# Patient Record
(demographics unavailable — no encounter records)

---

## 2024-10-07 NOTE — ADDENDUM
[FreeTextEntry1] : This note was written by Citlaly Campo, acting as the  for Dr. Newby. This note accurately reflects the work and decisions made by Dr. Newby.

## 2024-10-07 NOTE — HISTORY OF PRESENT ILLNESS
[Pain Location] : pain [] : right hip [Worsening] : worsening [___ wks] : [unfilled] week(s) ago [Constant] : ~He/She~ states the symptoms seem to be constant [Bending] : worsened by bending [Direct Pressure] : worsened by direct pressure [Hip Movement] : worsened by hip movement [Walking] : worsened by walking [Acetaminophen] : relieved by acetaminophen [NSAIDs] : relieved by nonsteroidal anti-inflammatory drugs [de-identified] : 48 year old female presents to the office today for a follow-up appointment for his right hip and groin pain. Since last visit, patient states pain is the same. Pain seems to be worse in an active day of movement. Pain exacerbated by rising from laying down as well as from a seated position. States pain is to the touch as well. Is here to go over results of her recent MRI. Has not started PT yet. Patient takes Advil and Tylenol for the pain, to very little relief. States she feels some intermittent numbness and tingling to the right leg. The patient denies any falls or trauma. No constitutional symptoms noted at this time.  [de-identified] : rising from laying down and sitting [de-identified] : Advil, Tylenol

## 2024-10-07 NOTE — DISCUSSION/SUMMARY
[Medication Risks Reviewed] : Medication risks reviewed [Surgical risks reviewed] : Surgical risks reviewed [PRN] : PRN [de-identified] : Patient is a 48-year-old female here today for follow-up of her right leg pain.  Based on her exam as well as her MRI and x-ray I do not think that the majority the pain she is experiencing is coming from her hip.  She has endorsed some numbness and tingling in the lower extremity.  I do think she may have a component of lumbar radiculopathy.  I have therefore given her referral over to the physiatry service for further evaluation management.  I have also given her a referral for GI to follow-up the results of her CT scan.  I will see her back on as-needed basis for her right hip.  All questions were asked and answered

## 2024-10-07 NOTE — PHYSICAL EXAM
[Normal] : Gait: normal [de-identified] : Musculoskeletal: ambulates with full flexion. Right hip exam showed no groin pain with SLR, ROM is with full flexion, 30 internal and 70 external rotation. BIENVENIDO positive,  FADIR negative.5/5 motor strength in bilateral lower extremities. Sensory: Intact in bilateral lower extremities. DTRs: Biceps, brachioradialis, triceps, patellar, ankle and plantar 2+ and symmetric bilaterally. Pulses: dorsalis pedis, posterior tibial, femoral, popliteal, and radial 2+ and symmetric bilaterally. [de-identified] :  RT HIP MRI 09/25/2024 IMPRESSION: -Mild degenerative changes of the hip. -Possible right-sided rectal wall thickening. Further evaluation with dedicated contrast-enhanced CT or sigmoidoscopy may be of utility for more definitive characterization. normal...

## 2024-10-09 NOTE — DATA REVIEWED
[FreeTextEntry1] : X-ray L Spine 8/2024 reviewed and interpreted by me: no significant degenerative changes seen  EXAM: [id number] - XR HIP 2-3V RT - ORDERED BY: AMA HELMS  EXAM: [id number] - XR LS SPINE AP LAT 2-3 VIEWS - ORDERED BY: AMA HELMS   PROCEDURE DATE: 08/09/2024    INTERPRETATION: XR LUMBAR SPINE AP AND LATERAL 2 OR 3 VIEWS, XR HIP 2 OR 3 VIEWS RIGHT  HISTORY: Lumbar spine and right hip and groin pain. No injury.  VIEWS: 2 views each IMAGES: 4  COMPARISON: None.  FINDINGS:  OSSEOUS STRUCTURES Fractures: None.  ALIGNMENT: Maintained.  DISC SPACES: Maintained.  PELVIC JOINTS: Hip and sacroiliac joints appear preserved. There is acetabular uncovering of both hips that may reflect a degree of dysplasia.  SOFT TISSUES: Unremarkable.  IMPRESSION: 1. No acute osseous abnormalities.  --- End of Report ---       AMANDA LOMAX MD; Attending Radiologist This document has been electronically signed. Aug 15 2024 12:57PM

## 2024-10-09 NOTE — HISTORY OF PRESENT ILLNESS
[FreeTextEntry1] : Ms. JED LUIS is a 48 year old female who presents with right groin pain radiating to low back and lateral hip. Patient reports that she saw a rheumatologist and labs showed she was ASCENCION positive but other workup negative. She was then referred to orthopedic surgery who ordered imaging of her hip revealing mild degenerative changes. She was referred here for workup of lumbar radiculopathy. Reports that she has a history of two C-sections ~20 years ago and uterine polyps. She was found to have thickening of her rectum on hip MRI and has a CT of abdomen/pelvis scheduled for further evaluation.   Location: right groin/buttock Onset: since July 2024, states she woke up one day with the pain but denies any specific injury Provocation/Palliative: Worse with sitting for long periods and going from sit to stand, better with activity Quality: sharp pain in right groin, shooting down medial right leg, ache in low back/lateral hip Radiation: Pain and tingling occasionally down medial proximal RLE Severity: 6/10  Timing: waxes and wanes   Admits to some tingling down RLE on medial side. Denies any associated leg weakness. Denies any loss of bowel/bladder control or any groin numbness. Previous medications trialed: Advil/Motrin does not provide significant relief Previous procedures relevant to complaint: denies Conservative therapy tried?: denies

## 2024-10-09 NOTE — ASSESSMENT
[FreeTextEntry1] : Ms. JED LUIS is a 48 year old female who presents with right groin pain radiating to low back and lateral hip. Right hip MRI showed mild degenerative changes. Pain likely secondary to lumbar radiculopathy vis SIJ inflammation. Denies any red flag signs. Will recommend:  - X-ray L Spine reviewed - MRI lumbar spine ordered to evaluate for upper lumbar nerve root impingement  - Start PT 2-3x/week for stretching, strengthening (especially of core muscles), ROM exercises, HEP and modalities PRN including myofascial release, moist heat  - Mobic 15mg PO QD PRN. Patient advised on cardiac/gi/renal side effects. Patient encouraged to take medication with food and not with other NSAIDs.   RTC after imaging. Patient aware of red flag signs including any changes to their bowel/bladder control, groin numbness or new weakness. Patient knows to seek immediate attention by calling 911 or going to nearest ER if these symptoms appear.   This patient is being managed for a complex chronic pain that requires ongoing medical management. The nature of this condition requires a longitudinal relationship and monitoring over time for appropriate treatment.

## 2024-10-23 NOTE — HISTORY OF PRESENT ILLNESS
[FreeTextEntry1] : Ms. JED LUIS is a 48 year old female who presents for follow up. At last visit, she was ordered an MRI L Spine, started on PT and given Mobic. She is taking Mobic with mild relief. Pain has been persistent. Pain is still mainly in R anterior groin and can refer to R buttock.    Location: right groin/buttock Onset: since July 2024, states she woke up one day with the pain but denies any specific injury Provocation/Palliative: Worse with sitting for long periods and going from sit to stand, better with activity Quality: sharp pain in right groin, shooting down medial right leg, ache in low back/lateral hip Radiation: Pain and tingling occasionally down medial proximal RLE but inconsistent Severity: 6/10 Timing: waxes and wanes  No bowel/bladder changes. No groin numbness.

## 2024-10-23 NOTE — DATA REVIEWED
[FreeTextEntry1] : EMG/NCS 10/2024 reviewed   EXAM: [id number] - MR SPINE LUMBAR  - ORDERED BY: GAVI KC   PROCEDURE DATE:  10/15/2024    INTERPRETATION:  MR LUMBAR SPINE WITHOUT CONTRAST  TECHNIQUE: Multiplanar multisequence imaging of the lumbar spine was performed without the administration of intravenous contrast.  CLINICAL INDICATION: Persistent right buttock/groin pain. Concern for upper lumbar radiculopathy. COMPARISON: None. ____________________ FINDINGS: Mildly degraded by motion artifact. L5-S1 is at image 54 of series 6. There are 5 lumbar type vertebral bodies.  Very mild lumbar levoscoliosis with an apex near L3-L4. Minimal retrolisthesis of L4 on L5. Lumbar alignment otherwise maintained. Visualized vertebral body heights are maintained. No acute fractures. No aggressive osseous lesions.  Conus has a normal appearance and terminates at L1.  Decreased T2 signal of the majority of the lumbar disks compatible with degenerative disc disease.  L1-L2: Disc bulge. No significant disc height loss. No significant spinal canal or neuroforaminal stenosis. Mild hypertrophic facet joint degeneration.  L2-L3: Disc bulge. No significant disc height loss. No significant spinal canal or neuroforaminal stenosis. Mild hypertrophic facet joint degeneration.  L3-L4: Disc bulge. No significant disc height loss. No significant spinal canal or neuroforaminal stenosis. Moderate hypertrophic facet joint degeneration with hypertrophy of ligamentum flavum and bilateral facet joint effusions.  L4-L5: Disc bulge. No significant disc height loss. No significant spinal canal or neuroforaminal stenosis. Moderate to severe hypertrophic facet joint degeneration with hypertrophy of ligamentum flavum and bilateral facet joint effusions.  L5-S1: Disc bulge. No significant disc height loss. No significant spinal canal or neuroforaminal stenosis. Moderate hypertrophic facet joint degeneration with hypertrophy of ligamentum flavum and bilateral facet joint effusions.  Paraspinal Soft Tissues/Retroperitoneum: Unremarkable. ____________________ IMPRESSION:  1.  Multilevel lumbar spondylosis as detailed above with no significant spinal canal or neuroforaminal stenosis.  --- End of Report ---       VICTOR MANUEL BARNES MD; Attending Radiologist

## 2024-10-23 NOTE — PHYSICAL EXAM
[FreeTextEntry1] : PE: Constitutional: In NAD, calm and cooperative MSK (Back/R hip)  Inspection: no gross swelling identified  Palpation: Tenderness of the right PSIS, SIJ, buttock  ROM: No pain with ROM  Strength: 5/5 strength in bilateral lower extremities with the exception of 4+/5 right hip flexion limited by pain  Reflexes: 2+ Patella reflex bilaterally, 2+ Achilles reflex bilaterally, negative clonus bilaterally  Sensation: Intact to light touch in bilateral lower extremities Special tests: SLR: negative bilaterally BIENVENIDO: negative on left, positive on right with pain in groin and SIJ FADIR: equivocal on R Facet loading: negative bilaterally Yeomans: negative bilaterally.

## 2024-10-23 NOTE — DATA REVIEWED
[FreeTextEntry1] : EMG/NCS 10/2024 reviewed   EXAM: [id number] - MR SPINE LUMBAR  - ORDERED BY: GAVI KC   PROCEDURE DATE:  10/15/2024    INTERPRETATION:  MR LUMBAR SPINE WITHOUT CONTRAST  TECHNIQUE: Multiplanar multisequence imaging of the lumbar spine was performed without the administration of intravenous contrast.  CLINICAL INDICATION: Persistent right buttock/groin pain. Concern for upper lumbar radiculopathy. COMPARISON: None. ____________________ FINDINGS: Mildly degraded by motion artifact. L5-S1 is at image 54 of series 6. There are 5 lumbar type vertebral bodies.  Very mild lumbar levoscoliosis with an apex near L3-L4. Minimal retrolisthesis of L4 on L5. Lumbar alignment otherwise maintained. Visualized vertebral body heights are maintained. No acute fractures. No aggressive osseous lesions.  Conus has a normal appearance and terminates at L1.  Decreased T2 signal of the majority of the lumbar disks compatible with degenerative disc disease.  L1-L2: Disc bulge. No significant disc height loss. No significant spinal canal or neuroforaminal stenosis. Mild hypertrophic facet joint degeneration.  L2-L3: Disc bulge. No significant disc height loss. No significant spinal canal or neuroforaminal stenosis. Mild hypertrophic facet joint degeneration.  L3-L4: Disc bulge. No significant disc height loss. No significant spinal canal or neuroforaminal stenosis. Moderate hypertrophic facet joint degeneration with hypertrophy of ligamentum flavum and bilateral facet joint effusions.  L4-L5: Disc bulge. No significant disc height loss. No significant spinal canal or neuroforaminal stenosis. Moderate to severe hypertrophic facet joint degeneration with hypertrophy of ligamentum flavum and bilateral facet joint effusions.  L5-S1: Disc bulge. No significant disc height loss. No significant spinal canal or neuroforaminal stenosis. Moderate hypertrophic facet joint degeneration with hypertrophy of ligamentum flavum and bilateral facet joint effusions.  Paraspinal Soft Tissues/Retroperitoneum: Unremarkable. ____________________ IMPRESSION:  1.  Multilevel lumbar spondylosis as detailed above with no significant spinal canal or neuroforaminal stenosis.  --- End of Report ---       VICTOR MANUEL BARNES MD; Attending Radiologist

## 2024-10-23 NOTE — ASSESSMENT
[FreeTextEntry1] : Ms. JED LUIS is a 48 year old female who presents with right groin pain radiating to low back and lateral hip. Right hip MRI showed mild degenerative changes. Etiology of pain is unclear given MRI L Spine did not show any significant nerve root impingements. Denies any red flag signs. Will recommend: - MRI L Spine reviewed with patient - Start PT 2-3x/week for stretching, strengthening (especially of core muscles), ROM exercises, HEP and modalities PRN including myofascial release, moist heat - Continue Mobic 15mg PO QD PRN. Patient advised on cardiac/gi/renal side effects. Patient encouraged to take medication with food and not with other NSAIDs. - Discussed R/B/A of a R Hip Intraarticular CSI to help diagnose the cause of her pain for which she would like to proceed. If pain receives significant relief from injection, pain is likely hip related. If not, we may explore other interventional options such as an SI joint CSI.   RTC after injection. Patient aware of red flag signs including any changes to their bowel/bladder control, groin numbness or new weakness. Patient knows to seek immediate attention by calling 911 or going to nearest ER if these symptoms appear.  This patient is being managed for a complex chronic pain that requires ongoing medical management. The nature of this condition requires a longitudinal relationship and monitoring over time for appropriate treatment.

## 2024-11-26 NOTE — PLAN
[FreeTextEntry1] :  48 year old female presents for post-op evaluation.  -Discussed alternate management for AUB- surgical/medical   F/u with Dr. Cartwright. Lucila Recinos MD

## 2024-11-26 NOTE — HISTORY OF PRESENT ILLNESS
[Pain is well-controlled] : pain is well-controlled [Clean/Dry/Intact] : clean, dry and intact [None] : no vaginal bleeding [Normal] : normal [Pathology reviewed] : pathology reviewed [Fever] : no fever [Chills] : no chills [Nausea] : no nausea [Vomiting] : no vomiting [Erythema] : not erythematous [de-identified] : 11/7/2024 [de-identified] : Hysteroscopy with dilation and curettage.   [de-identified] :  48 year old female presents for post-op evaluation. Reports doing okay and is spotting. Notes eating okay.

## 2024-11-26 NOTE — END OF VISIT
[FreeTextEntry3] : I, Magnolia Kat, acted as a scribe on behalf of Dr. Lucila Recinos M.D. on 11/26/2024.   All medical entries made by the scribe were at my, Dr. Lucila Recinos M.D., direction and personally dictated by me on 11/26/2024. I have reviewed the chart and agree that the record accurately reflects my personal performance of the history, physical exam, assessment and plan. I have also personally directed, reviewed, and agreed with the chart.

## 2024-11-29 NOTE — PAST MEDICAL HISTORY
[Menstruating] : The patient is menstruating [Menarche Age ____] : age at menarche was [unfilled] [History of Hormone Replacement Treatment] : has no history of hormone replacement treatment [Total Preg ___] : G[unfilled] [Age At Live Birth ___] : Age at live birth: [unfilled]

## 2024-11-29 NOTE — DATA REVIEWED
[FreeTextEntry1] : ***MAMMO DIG HAYDEN***  04/18/24  CLINICAL INDICATION: Patient is 48 years old and is seen for screening. The patient has no personal history of cancer. The patient has a history of right excision in November, 2022 - benign. The patient has the following family history of breast cancer: paternal aunt, at age 40.  RISK ASSESSMENT: Tyrer-Cuzick Lifetime Risk: 26.1%  LAST CLINICAL BREAST EXAM: The patient reports their last clinical breast exam was performed within the past year.  COMPARISON STUDIES: The present examination has been compared to a prior imaging study dated 03/31/2023, 9/29/2022, 2/28/2022, 2/18/2022, 2/15/2021, and 2/6/2020.  TECHNIQUE: Bilateral mammography was performed including CC and MLO views. Left breast magnification views were obtained. Additional imaging analysis was performed using CAD (computer-aided detection) software. Digital breast tomosynthesis was performed and used in the interpretation of images.  FINDINGS: The breasts are extremely dense, which lowers the sensitivity of mammography.  No suspicious mass, suspicious microcalcifications, or other sign of malignancy is identified.  There are new calcifications in the upper-outer left breast, which on the magnification views appear to have a layering appearance, compatible with benign milk of calcium.  Postsurgical scar in the upper-outer posterior left breast is unchanged.  Bilateral nodularity is again noted.  BREAST ARTERIAL CALCIFICATION ([place]): Grade 0 - No vascular calcifications. Note: The absence of breast arterial calcification does not exclude cardiovascular disease. Management of cardiovascular risk factors should be based clinically.  BILATERAL BREAST ULTRASOUND COMPLETE  CLINICAL INDICATION: The breasts are extremely dense, which lowers the sensitivity of mammography. Recommended ultrasound follow-up.  COMPARISON STUDIES: Dated  7/21/2023, 3/31/2023, 1/13/2023, 8/29/2022, 6/8/2022, 2/28/2022, 2/18/2022, 2/25/2021, 2/15/2021, 8/7/2020, 2/6/2020.  RIGHT BREAST: Sonographic evaluation of the right breast was performed in its entirety, including each of the four quadrants and the retroareolar region, in clockwise fashion. Images were obtained by  the technologist and submitted for interpretation.  FINDINGS: The breast parenchyma demonstrates a heterogeneous background echotexture (mixed fatty and fibroglandular).  No suspicious solid mass.  At 6:00, 6 cm from the nipple there is no significant change since the 6/8/2022 images in a 0.9 x 0.3 x 0.8 cm circumscribed ovoid hypoechoic nodule.  At the retroareolar position there is no significant change since the 6/8/2022 images in a 0.7 x 0.4 x 0.5 cm circumscribed ovoid hypoechoic nodule.  Multiple scattered cysts are noted in the right breast, up to 1.1 cm in size.  LEFT BREAST: Sonographic evaluation of the left breast was performed in its entirety, including each of the four quadrants and the retroareolar region, in clockwise fashion. Images were obtained by  the technologist and submitted for interpretation.  FINDINGS: The breast parenchyma demonstrates a heterogeneous background echotexture (mixed fatty and fibroglandular).  No suspicious solid mass.  At 12:00, 3 cm from the nipple there is no significant change since the 2/6/2020 images in a 0.7 x 0.5 x 0.5 cm circumscribed hypoechoic nodule.  At 2:00, 4 cm from the nipple is a 0.5 x 0.3 x 0.5 cm circumscribed ovoid hypoechoic nodule without significant change since the 2/15/2021 images.  Multiple scattered cysts are noted in the left breast up to 0.7 cm in size.  IMPRESSION: No mammographic or sonographic evidence of malignancy. No suspicious changes.  RECOMMENDATION:  Mammography in 1 year.  Additionally, please note the last breast MRI report of 4/1/2024 recommended follow-up MRI in 6 months.  BI-RADS 2 - Benign Finding(s)

## 2024-11-29 NOTE — PHYSICAL EXAM
[Normocephalic] : normocephalic [Atraumatic] : atraumatic [EOMI] : extra ocular movement intact [PERRL] : pupils equal, round and reactive to light [Sclera nonicteric] : sclera nonicteric [Supple] : supple [No Supraclavicular Adenopathy] : no supraclavicular adenopathy [Examined in the supine and seated position] : examined in the supine and seated position [Symmetrical] : symmetrical [Bra Size: ___] : Bra Size: [unfilled] [None] : no ptosis [No dominant masses] : no dominant masses in right breast  [No dominant masses] : no dominant masses left breast [No Nipple Retraction] : no left nipple retraction [No Nipple Discharge] : no left nipple discharge [No Axillary Lymphadenopathy] : no left axillary lymphadenopathy [No Edema] : no edema [No Rashes] : no rashes [No Ulceration] : no ulceration [de-identified] : Well-healed incision UOQ/lower axilla

## 2024-11-29 NOTE — HISTORY OF PRESENT ILLNESS
[FreeTextEntry1] : Problem List: 1. 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 Right breast 12:00, 2cm FN complicated cyst, as well as multiple smaller complicated cysts and two new hypoechoic nodules (stable since June '22)      -6 month followup  3. Dense breasts, D 4. Elevated Tyrer-Cuzick score 36%  5. Family history of breast and pancreatic cancer  INTERVAL HISTORY: (11/28/22): 46 year old female who presents for post-op followup. She is healing well without complaint.   (09/06/23): Patient presents for follow up visit. Patient with no breast complaints.   HPI:  46 year old premenopausal female who underwent annual screening and was found to have complicated cyst in the right breast and a 3cm mass in the left breast. Patient underwent core needle biopsy of left breast on 8/29/22. Final pathology has demonstrated - Fibroadenomatoid nodule. She felt this left nodule was getting larger and more bothersome and elected to proceed with surgical excision as well as right cyst aspiration.   Fam hx-Mother had pancreatic cancer, paternal aunt had breast cancer at 42.   Imaging at [facility] 2/18/22 screening mammo impression- left breast additional imaging at this time with diagnostic mammography and u/s. Birads 0. new low density mass upper outer left posterior not accounted for on today's same day screening u/s  2/28/22 left diagnostic mammo edil impression- probable benign finding, benign breast nodule  6/8/22 right breast ultrasound impression-right breast corresponds to a stable 2.5cm complicated cyst. subcentimeter hypoechoic nodules are new and probably benign. Recommend 6 month follow up is advised. Birads 3  [facility] and Ultrasound Breast Limited Left: Density D, Impression - No suspicious mass, microcalcifications or other sign of malignancy is identified. A circumscribed mass in the upper anterior left breast has increased in size, now measuring 1 cm. This correlated with cyst on concurrent ultrasound. No significant change in right breast. No mammographic or sonographic evidence of malignancy. BI-RADS 2   [facility] Right: Impression - No significant change in right breast ultrasound findings for one year. Continued ultrasound follow up in 6 months to confirm longer term stability. BI-RADS 3   12/02/24: Pt is transferring care from my collegue Dr. Camargo.  04/18/24 MAMMO SCREEN: No mammographic or sonographic evidence of malignancy. No suspicious changes.

## 2024-11-29 NOTE — ASSESSMENT
[FreeTextEntry1] : 47 year old female with a left breast biopsy-proven fibroadenomatoid nodule s/p excisional biopsy on 11/11/22, as well as a right breast palpable/painful cyst s/p aspiration, as well as additional hypoechoic nodules in the right breast for which 6 month followup US is recommended.   CBE is benign. We reviewed her recent right breast ultrasound, will continue to follow nodules and repeat in 6 months, at the time of her annual screening imaging.   Re-calculated her Tyrer Cuzick score in the office, 36%. Patient qualifies for high risk screening.  We discussed the clinical significance of being high-risk for breast cancer and the implications for additional screening. This would include both biannual clinical breast exams as well as imaging screening with mammogram and MRI (in 6-month intervals). We discussed that MRI screening does not improve overall survival from breast cancer, but has been shown to detect breast cancer earlier, resulting in smaller surgeries, less invasive treatment, and less morbidity. We discussed risks of MRI including contrast administration, possibility of additional imaging/biopsies, as well as the contrast accumulation in the brain (which has not shown any clinical significance to date).   Patient verbalized understanding for all treatment plans discussed. All of her questions were answered to the best of my ability. She was encouraged to call the office if any questions or concerns or come in sooner if needed.  Plan: 1.  Arlyn Camacho MD, FACS Director, Breast Surgery [facility]

## 2025-01-28 NOTE — HISTORY OF PRESENT ILLNESS
[FreeTextEntry1] :  49 year old female presents for bleeding f/u. Reports breakthrough bleeding since surgery in November. States recent periods have had more clots. Denies having this amount of clots in the past. Recent sono showed another polyp that's 1.5 cm. Notes pelvic pain is still occurrring. Had lumpectomy a year ago and sees a breast surgeon. Denies needing breast rx. Pt would like to transfer care to Dr. Recinos moving forward.   US Pelvis FINDINGS: Uterus: 10.7 cm x 5.6 cm x 6.2 cm. Within normal limits. Endometrium: 15 mm. Mildly prominent endometrium measuring 15 mm. 1.9 x 1.0 x 1.7 cm nodular focus possibly a polyp versus focal endometrial hyperplasia.  Right ovary: 3.1 cm x 1.9 cm x 2.3 cm. 1.3 cm dominant follicle. Within normal limits. Normal arterial and venous waveforms. Left ovary: 4.1 cm x 2.2 cm x 1.6 cm. 1.5 cm dominant follicle. Within normal limits. Normal arterial and venous waveforms.  IMPRESSION: Mildly prominent endometrium measuring 15 mm. 1.9 x 1.0 x 1.7 cm nodular focus possibly a polyp versus focal endometrial hyperplasia.  --- End of Report ---

## 2025-01-28 NOTE — PLAN
[FreeTextEntry1] :  49 year old female presents for bleeding f/u.   -discussed management of AUB,  D&C -Discussed and advised Mirena IUD insertion at time of D&C, addressed pt's concerns -reviewed risk of migration, perforation, bleeding profile with Mirena IUD, discussed r/b/a -reviewed GNRH analogs, hysterectomy, pt declining -discussed risks of hysteroscopy including but not limited to VTE, SSI, uterine perforation, fluid overload, damage to nearby organs  Lucila Recinos MD .

## 2025-03-10 NOTE — PLAN
[FreeTextEntry1] : VISION SCREENING SAFETY / HEALTHY HABITS REVIEWED HEALTHY DIET AND LIFESTYLE MODIFICATIONS VACCINATIONS DISCUSSED: FLU, COVID, TDAP CHECK ROUTINE LAB WORK EKG DECLINED FOLLOW-UP ALL SPECIALISTS AS DIRECTED; WILL HAVE RHEUMATOLOGY SEND OVER CONSULTANT NOTE CALL WITH ANY QUESTIONS, CONCERNS OR CHANGES

## 2025-03-10 NOTE — HISTORY OF PRESENT ILLNESS
[FreeTextEntry1] : PHYSICAL [de-identified] : MS. LUIS IS A PLEASANT 48 YO PRESENTING FOR YEARLY PHYSICAL.  PLANS FOR D & C FOR THICKENED ENDOMETRIUM WITH GYN/ONCOLOGY.  SAW RHEUMATOLOGY FOR +ANA.  HAD ADDITIONAL LAB WORK AND ADVISED "INCONCLUSIVE" AND TO FOLLOW-UP IN ONE YEAR WHICH IS THIS APRIL.  RHEUMATOLOGY: DR. CAMPOS OFFICE  GYN ONCOLOGY: DR. CORCORAN GYN: DR. OBREGON BREAST SURGERY: DR. BAKER PM&R: DR. KC ORTHOPEDICS: DR. SHIRLEY NEUROLOGY: RAFFI ALVAREZ GI: DR. TAVERAS

## 2025-03-10 NOTE — HEALTH RISK ASSESSMENT
[Very Good] : ~his/her~ current health as very good [Excellent] : ~his/her~  mood as  excellent [Yes] : Yes [2 - 3 times a week (3 pts)] : 2 - 3  times a week (3 points) [1 or 2 (0 pts)] : 1 or 2 (0 points) [Never (0 pts)] : Never (0 points) [No] : In the past 12 months have you used drugs other than those required for medical reasons? No [Little interest or pleasure doing things] : 1) Little interest or pleasure doing things [Feeling down, depressed, or hopeless] : 2) Feeling down, depressed, or hopeless [0] : 2) Feeling down, depressed, or hopeless: Not at all (0) [PHQ-2 Negative - No further assessment needed] : PHQ-2 Negative - No further assessment needed [Never] : Never [NO] : No [HIV test declined] : HIV test declined [Hepatitis C test declined] : Hepatitis C test declined [None] : None [With Family] : lives with family [# of Members in Household ___] :  household currently consist of [unfilled] member(s) [Employed] : employed [College] : College [] :  [# Of Children ___] : has [unfilled] children [Sexually Active] : sexually active [Feels Safe at Home] : Feels safe at home [Reports normal functional visual acuity (ie: able to read med bottle)] : Reports normal functional visual acuity [Smoke Detector] : smoke detector [Carbon Monoxide Detector] : carbon monoxide detector [Safety elements used in home] : safety elements used in home [Seat Belt] :  uses seat belt [Sunscreen] : uses sunscreen [With Patient/Caregiver] : , with patient/caregiver [Audit-CScore] : 3 [de-identified] : PELETON BIKE THREE DAYS A WEEK, STRENGHTENING, WALKING [de-identified] : TRIES TO KEEP A HEALTHY DIET [[id number]] : 0 [Change in mental status noted] : No change in mental status noted [High Risk Behavior] : no high risk behavior [Reports changes in hearing] : Reports no changes in hearing [Reports changes in vision] : Reports no changes in vision [Reports changes in dental health] : Reports no changes in dental health [Travel to Developing Areas] : does not  travel to developing areas [TB Exposure] : is not being exposed to tuberculosis [Caregiver Concerns] : does not have caregiver concerns [MammogramDate] : 04/24 [PapSmearDate] : 08/24 [ColonoscopyDate] : 06/24 [de-identified] : GLASSES FOR DISTANCE [AdvancecareDate] : 03/25

## 2025-04-29 NOTE — HISTORY OF PRESENT ILLNESS
[FreeTextEntry1] :  49 year old female presents for POV and  IUD check. Has Mirena IUD. Reports doing well. Currently having light bleeding. Notes blood when wiping. Experienced cramping right after insertion, but no longer having cramping. Would like to return here for annual. Reports having mammo done last week.  states she is happy with the IUD path reviewed  [Mammogramdate] : 4/2025 [BreastSonogramDate] : 4/2025

## 2025-04-29 NOTE — END OF VISIT
[FreeTextEntry3] : I, Magnolia Kat, acted as a scribe on behalf of Dr. Lucila Recinos M.D. on 04/29/2025.   All medical entries made by the scribe were at my, Dr. Lucila Recinos M.D., direction and personally dictated by me on 04/29/2025. I have reviewed the chart and agree that the record accurately reflects my personal performance of the history, physical exam, assessment and plan. I have also personally directed, reviewed, and agreed with the chart.

## 2025-04-29 NOTE — PROCEDURE
[Locate IUD] : locate IUD [Transvaginal Ultrasound] : transvaginal ultrasound [FreeTextEntry4] : IUD in correct place at fundus, no adenxal masses

## 2025-04-29 NOTE — PLAN
[FreeTextEntry1] :  49 year old female presents for IUD check.  -Continue Mirena -Mammo UTD -path reviewed F/u for annual pt prefers this office Lucila Recinos MD

## 2025-04-29 NOTE — PHYSICAL EXAM
[Appropriately responsive] : appropriately responsive [Alert] : alert [No Acute Distress] : no acute distress [Oriented x3] : oriented x3 [Labia Majora] : normal [Labia Minora] : normal [Scant] : There was scant vaginal bleeding [Normal] : normal [Uterine Adnexae] : normal

## 2025-06-03 NOTE — ASSESSMENT
[FreeTextEntry1] : 49 year old female with a left breast biopsy-proven fibroadenomatoid nodule s/p excisional biopsy on 11/11/22,and high risk  due to family hx Re-calculated her Tyrer Cuzick score in the office, 36%. Patient qualifies for high risk screening.  We discussed the clinical significance of being high-risk for breast cancer and the implications for additional screening. This would include both biannual clinical breast exams as well as imaging screening with mammogram and MRI (in 6-month intervals). We discussed that MRI screening does not improve overall survival from breast cancer, but has been shown to detect breast cancer earlier, resulting in smaller surgeries, less invasive treatment, and less morbidity. We discussed risks of MRI including contrast administration, possibility of additional imaging/biopsies, as well as the contrast accumulation in the brain (which has not shown any clinical significance to date).  Pt did request that MRI are spaced out a bit more than one year. I think given stable imaging, this is reasonable. She is due for MRI sept 2025, but I think it is reasonable to do every other year MRI.  She does understand that mammo is not as good as mri and we can miss a cancer on mammogram which we would have caught on MRI.  I also stated the standard of care is to do annual MRI for high risk screening, but there is room for individualizing these recommendations. For example her family  hx is not extensive, given stable MRI last year, reasonable to do every other year unless new findings occur on mammo US.   Breast calcifications and elevated lifetime risk of breast cancer Breast calcifications are well-managed with no concerning findings on recent diagnostic mammogram and sonogram. No palpable masses. Family history includes a paternal aunt with breast cancer at age 40, elevating risk to 30% due to family history and previous biopsy. imaging stability supports extending MRI interval. - Order bilateral diagnostic mammogram in April 2026 - Schedule follow-up appointment in person after April 2026 mammogram - Order MRI in September 2026 - Advise to report any changes in breast condition in the interval    This was a 30 minutes visit. This consists of the time I spent preparing to see the patient, reviewing and obtaining her history, and reviewing prior tests and her chart in general. This also includes my time spent in performing a medically appropriate examination and/or evaluation, counseling and educating the patient/family/caregiver, ordering medications, tests, or procedures, referring and communicating with other health care professionals, documenting clinical information in the electronic or other health records, independently interpreting results (not separately reported) and communicating results to the patient/family/caregiver, coordination of her care. All the questions were answered to her satisfaction. The plan and expectations were reviewed with the patient; she understood, agreed with it, and was grateful for the care and attention received today.  Arlyn Camacho MD, FACS Director, Breast Surgery [facility]

## 2025-06-03 NOTE — ASSESSMENT
[FreeTextEntry1] : 49 year old female with a left breast biopsy-proven fibroadenomatoid nodule s/p excisional biopsy on 11/11/22,and high risk  due to family hx Re-calculated her Tyrer Cuzick score in the office, 36%. Patient qualifies for high risk screening.  We discussed the clinical significance of being high-risk for breast cancer and the implications for additional screening. This would include both biannual clinical breast exams as well as imaging screening with mammogram and MRI (in 6-month intervals). We discussed that MRI screening does not improve overall survival from breast cancer, but has been shown to detect breast cancer earlier, resulting in smaller surgeries, less invasive treatment, and less morbidity. We discussed risks of MRI including contrast administration, possibility of additional imaging/biopsies, as well as the contrast accumulation in the brain (which has not shown any clinical significance to date).  Pt did request that MRI are spaced out a bit more than one year. I think given stable imaging, this is reasonable. She is due for MRI sept 2025, but I think it is reasonable to do every other year MRI.  She does understand that mammo is not as good as mri and we can miss a cancer on mammogram which we would have caught on MRI.  I also stated the standard of care is to do annual MRI for high risk screening, but there is room for individualizing these recommendations. For example her family  hx is not extensive, given stable MRI last year, reasonable to do every other year unless new findings occur on mammo US.   Breast calcifications and elevated lifetime risk of breast cancer Breast calcifications are well-managed with no concerning findings on recent diagnostic mammogram and sonogram. No palpable masses. Family history includes a paternal aunt with breast cancer at age 40, elevating risk to 30% due to family history and previous biopsy. imaging stability supports extending MRI interval. - Order bilateral diagnostic mammogram in April 2026 - Schedule follow-up appointment in person after April 2026 mammogram - Order MRI in September 2026 - Advise to report any changes in breast condition in the interval    This was a 30 minutes visit. This consists of the time I spent preparing to see the patient, reviewing and obtaining her history, and reviewing prior tests and her chart in general. This also includes my time spent in performing a medically appropriate examination and/or evaluation, counseling and educating the patient/family/caregiver, ordering medications, tests, or procedures, referring and communicating with other health care professionals, documenting clinical information in the electronic or other health records, independently interpreting results (not separately reported) and communicating results to the patient/family/caregiver, coordination of her care. All the questions were answered to her satisfaction. The plan and expectations were reviewed with the patient; she understood, agreed with it, and was grateful for the care and attention received today.  Arlyn Camacho MD, FACS Director, Breast Surgery [facility]

## 2025-06-03 NOTE — REASON FOR VISIT
[Home] : at home, [unfilled] , at the time of the visit. [Telehealth (audio & video)] : This visit was provided via telehealth using real-time 2-way audio visual technology. [Verbal consent obtained from patient] : the patient, [unfilled] [Follow-Up: _____] : a [unfilled] follow-up visit [Telephone (audio)] : This telephonic visit was provided via audio only technology. [No access to tele-video equipment] : patient lacks access to tele-video equipment.

## 2025-06-03 NOTE — PAST MEDICAL HISTORY
[Menstruating] : The patient is menstruating [Menarche Age ____] : age at menarche was [unfilled] [Total Preg ___] : G[unfilled] [Age At Live Birth ___] : Age at live birth: [unfilled] [History of Hormone Replacement Treatment] : has no history of hormone replacement treatment

## 2025-06-03 NOTE — DATA REVIEWED
[FreeTextEntry1] : ***MAMMO LEFT 04/30/25 FINDINGS: -The breasts are extremely dense, which lowers the sensitivity of mammography. -Again noted are grouped calcifications in the upper-outer left breast, with a layering appearance on the true lateral magnification view, compatible with benign milk of calcium. These were present on the 2024 study and are unchanged.  ***RIGHT BREAST US LIMITED*** 04/30/25 FINDINGS: -The breast parenchyma demonstrates a heterogeneous background echotexture (mixed fatty and fibroglandular). -At 6:00, 6 cm from the nipple again identified is a 1.2 x 0.4 x 1.1 cm ovoid benign-appearing hypoechoic nodule, without significant change since 2022 allowing for technical differences.  IMPRESSION: -No mammographic or sonographic evidence of malignancy, no significant changes since prior studies. -The patient was informed of the results in person. RECOMMENDATION:   -Mammography in 1 year. BI-RADS 2 - Benign Finding(s  ***MAMMO/US*** 04/21/25 MAMMO: RISK ASSESSMENT: Tyrer-Cuzick Lifetime Risk: 30.2%  FINDINGS: -The breasts are extremely dense, which lowers the sensitivity of mammography. -Right breast: Multiple similar stable partially circumscribed partially obscured masses including a 7 mm circumscribed mass, posterior 2 o'clock, similar to 2/18/2022. No suspicious mass, suspicious microcalcifications, or other sign of malignancy is identified. -Left breast: Grouping calcifications posterior 1:00. Partially circumscribed partially obscured 1 cm mass at 5-6 o'clock, stable from 3/31/2023. Postsurgical changes of the upper-outer quadrant. BREAST ARTERIAL CALCIFICATION ([place]): Grade 0 - No vascular calcifications. Note: The absence of breast arterial calcification does not exclude cardiovascular disease. Management of cardiovascular risk factors should be based clinically.  ****MR BREAST WAWIC BI W CAD**** 10/02/24  INTERPRETATION:  CLINICAL INDICATION: 48 years old female presents for breast MRI for requested 6 month MRI follow-up from 4/1/2024 for a 1.1 cm T2 hyperintense enhancing mass posterior upper outer right breast felt to be probably benign initially identified on 9/22/2023. Additionally bilateral enhancing foci with areas of patchy parenchymal enhancement were also requested for follow-up.  FINDINGS:  The breast parenchyma is composed of heterogenous fibroglandular tissue.   The breasts demonstrate marked background parenchymal enhancement. This significantly lowers the sensitivity of the breast MRI for detection of small enhancing lesions. Scattered sub-centimeter cysts. RIGHT BREAST: -Right upper outer breast redemonstrated 1.1 cm T2 avid enhancing mass with dark internal non-enhancing septation without significant change dating back to 9/22/2023 consistent with fibroadenoma (postcontrast image 112). -There are numerable similar-appearing scattered enhancing nonspecific foci.  There is no suspicious enhancement in the right breast. LEFT BREAST: -There are innumerable similar-appearing scattered enhancing nonspecific foci. Dominant foci in the lower outer quadrant are without significant change. There is no suspicious enhancement in the left breast. AXILLA/OTHER: -There is no significant axillary or internal mammary lymphadenopathy. IMPRESSION: -No MRI evidence of malignancy. -Right breast area of concern demonstrating stability since 9/22/2023 consistent with benign fibroadenoma. No additional follow-up is warranted. RECOMMENDATION:   -Resume Annual Mammography on Schedule. BI-RADS 2 - Benign Finding(s)   ***MAMMO DIG HAYDEN***  04/18/24  CLINICAL INDICATION: Patient is 48 years old and is seen for screening. The patient has no personal history of cancer. The patient has a history of right excision in November, 2022 - benign. The patient has the following family history of breast cancer: paternal aunt, at age 40.  RISK ASSESSMENT: Jose Lifetime Risk: 26.1%   Imaging at [facility]  2/18/22 screening mammo impression- left breast additional imaging at this time with diagnostic mammography and u/s. Birads 0. new low density mass upper outer left posterior not accounted for on today's same day screening u/s  2/28/22 left diagnostic mammo edil impression- probable benign finding, benign breast nodule  6/8/22 right breast ultrasound impression-right breast corresponds to a stable 2.5cm complicated cyst. subcentimeter hypoechoic nodules are new and probably benign. Recommend 6 month follow up is advised. Birads 3  [facility] and Ultrasound Breast Limited Left: Density D, Impression - No suspicious mass, microcalcifications or other sign of malignancy is identified. A circumscribed mass in the upper anterior left breast has increased in size, now measuring 1 cm. This correlated with cyst on concurrent ultrasound. No significant change in right breast. No mammographic or sonographic evidence of malignancy. BI-RADS 2   07/21/23: [facility]: Ultrasound Breast Limited Right: Impression - No significant change in right breast ultrasound findings for one year. Continued ultrasound follow up in 6 months to confirm longer term stability. BI-RADS 3   12/02/24: Pt is transferring care from my collegue Dr. Camargo.  04/18/24 MAMMO SCREEN: No mammographic or sonographic evidence of malignancy. No suspicious changes.  LAST CLINICAL BREAST EXAM: The patient reports their last clinical breast exam was performed within the past year.  COMPARISON STUDIES: The present examination has been compared to a prior imaging study dated 03/31/2023, 9/29/2022, 2/28/2022, 2/18/2022, 2/15/2021, and 2/6/2020.  TECHNIQUE: Bilateral mammography was performed including CC and MLO views. Left breast magnification views were obtained. Additional imaging analysis was performed using CAD (computer-aided detection) software. Digital breast tomosynthesis was performed and used in the interpretation of images.  FINDINGS: The breasts are extremely dense, which lowers the sensitivity of mammography.  No suspicious mass, suspicious microcalcifications, or other sign of malignancy is identified.  There are new calcifications in the upper-outer left breast, which on the magnification views appear to have a layering appearance, compatible with benign milk of calcium.  Postsurgical scar in the upper-outer posterior left breast is unchanged.  Bilateral nodularity is again noted.  BREAST ARTERIAL CALCIFICATION ([place]): Grade 0 - No vascular calcifications. Note: The absence of breast arterial calcification does not exclude cardiovascular disease. Management of cardiovascular risk factors should be based clinically.  BILATERAL BREAST ULTRASOUND COMPLETE  CLINICAL INDICATION: The breasts are extremely dense, which lowers the sensitivity of mammography. Recommended ultrasound follow-up.  COMPARISON STUDIES: Dated  7/21/2023, 3/31/2023, 1/13/2023, 8/29/2022, 6/8/2022, 2/28/2022, 2/18/2022, 2/25/2021, 2/15/2021, 8/7/2020, 2/6/2020.  RIGHT BREAST: Sonographic evaluation of the right breast was performed in its entirety, including each of the four quadrants and the retroareolar region, in clockwise fashion. Images were obtained by  the technologist and submitted for interpretation.  FINDINGS: The breast parenchyma demonstrates a heterogeneous background echotexture (mixed fatty and fibroglandular).  No suspicious solid mass.  At 6:00, 6 cm from the nipple there is no significant change since the 6/8/2022 images in a 0.9 x 0.3 x 0.8 cm circumscribed ovoid hypoechoic nodule.  At the retroareolar position there is no significant change since the 6/8/2022 images in a 0.7 x 0.4 x 0.5 cm circumscribed ovoid hypoechoic nodule.  Multiple scattered cysts are noted in the right breast, up to 1.1 cm in size.  LEFT BREAST: Sonographic evaluation of the left breast was performed in its entirety, including each of the four quadrants and the retroareolar region, in clockwise fashion. Images were obtained by  the technologist and submitted for interpretation.  FINDINGS: The breast parenchyma demonstrates a heterogeneous background echotexture (mixed fatty and fibroglandular).  No suspicious solid mass.  At 12:00, 3 cm from the nipple there is no significant change since the 2/6/2020 images in a 0.7 x 0.5 x 0.5 cm circumscribed hypoechoic nodule.  At 2:00, 4 cm from the nipple is a 0.5 x 0.3 x 0.5 cm circumscribed ovoid hypoechoic nodule without significant change since the 2/15/2021 images.  Multiple scattered cysts are noted in the left breast up to 0.7 cm in size.  IMPRESSION: No mammographic or sonographic evidence of malignancy. No suspicious changes.  RECOMMENDATION:  Mammography in 1 year.  Additionally, please note the last breast MRI report of 4/1/2024 recommended follow-up MRI in 6 months.  BI-RADS 2 - Benign Finding(s)

## 2025-06-03 NOTE — HISTORY OF PRESENT ILLNESS
[FreeTextEntry1] : Problem List: 1. 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 Right breast 12:00, 2cm FN complicated cyst, as well as multiple smaller complicated cysts and two new hypoechoic nodules (stable since June '22)      -6 month followup  3. Dense breasts, D 4. Elevated Tyrer-Cuzick score 36%  5. Fam hx-Mother had pancreatic cancer, paternal aunt had breast cancer at 42.  **************************************************************************************************************************************************************************************************************************************************************************************************************************************************************************************  06/02/25: Mrs. Shepherd presents today via Telemed to discuss recent Brease and US. MAMMO LEFT DIAG BREAST (04/30/25): The breasts are extremely dense, which lowers the sensitivity of mammography. Again, noted are grouped calcifications in the upper-outer left breast, with a layering appearance on the true lateral magnification view, compatible with benign milk of calcium. These were present on the 2024 study and are unchanged. US RIGHT BREAST (04/30/25): The breast parenchyma demonstrates a heterogeneous background echotexture (mixed fatty and fibroglandular). At 6:00, 6 cm from the nipple again identified is a 1.2 x 0.4 x 1.1 cm ovoid benign-appearing hypoechoic nodule, without significant change since 2022 allowing for technical differences.  The patient presents for follow-up regarding recent mammogram findings.  They recently underwent a bilateral mammogram 4/2025 which required a diagnostic follow-up on the left side due to the presence of calcifications and a nodule. They have not felt any lumps or changes themselves. Upon follow-up, a sonogram and additional mammogram were performed, and no further imaging was needed at that time.  Their family history includes a paternal aunt who was diagnosed with breast cancer at age 40. There are no other known family members with breast cancer. They have previously undergone a biopsy, which contributes to an elevated risk assessment.  No changes or lumps have been noticed in their breast.  12/02/24: 49 y.o. female. pt is new to me. i reviewed all images and pathology pertinent to breast.  pt had MRI oct 2, 2024. this was a  6month follow up for abnormalities noted on the April 2024 MRI.  On this MRI, findings were not concerning and it was recommended to return to routine high-risk screening.      11/28/24: 46 year old female who presents for post-op follow-up. She is healing well without complaint.   09/06/23: Patient presents for follow up visit. Patient with no breast complaints.  ********************************************************************************************************************************************************************************************************************************************************************************************************  HPI:  46-year-old premenopausal female who underwent annual screening and was found to have complicated cyst in the right breast and a 3cm mass in the left breast. Patient underwent core needle biopsy of left breast on 8/29/22. Final pathology has demonstrated - Fibroadenomatoid nodule. She felt this left nodule was getting larger and more bothersome and elected to proceed with surgical excision as well as right cyst aspiration.   Fam hx-Mother had pancreatic cancer, paternal aunt had breast cancer at 42.

## 2025-06-03 NOTE — DATA REVIEWED
[FreeTextEntry1] : ***MAMMO LEFT 04/30/25 FINDINGS: -The breasts are extremely dense, which lowers the sensitivity of mammography. -Again noted are grouped calcifications in the upper-outer left breast, with a layering appearance on the true lateral magnification view, compatible with benign milk of calcium. These were present on the 2024 study and are unchanged.  ***RIGHT BREAST US LIMITED*** 04/30/25 FINDINGS: -The breast parenchyma demonstrates a heterogeneous background echotexture (mixed fatty and fibroglandular). -At 6:00, 6 cm from the nipple again identified is a 1.2 x 0.4 x 1.1 cm ovoid benign-appearing hypoechoic nodule, without significant change since 2022 allowing for technical differences.  IMPRESSION: -No mammographic or sonographic evidence of malignancy, no significant changes since prior studies. -The patient was informed of the results in person. RECOMMENDATION:   -Mammography in 1 year. BI-RADS 2 - Benign Finding(s  ***MAMMO/US*** 04/21/25 MAMMO: RISK ASSESSMENT: Tyrer-Cuzick Lifetime Risk: 30.2%  FINDINGS: -The breasts are extremely dense, which lowers the sensitivity of mammography. -Right breast: Multiple similar stable partially circumscribed partially obscured masses including a 7 mm circumscribed mass, posterior 2 o'clock, similar to 2/18/2022. No suspicious mass, suspicious microcalcifications, or other sign of malignancy is identified. -Left breast: Grouping calcifications posterior 1:00. Partially circumscribed partially obscured 1 cm mass at 5-6 o'clock, stable from 3/31/2023. Postsurgical changes of the upper-outer quadrant. BREAST ARTERIAL CALCIFICATION ([place]): Grade 0 - No vascular calcifications. Note: The absence of breast arterial calcification does not exclude cardiovascular disease. Management of cardiovascular risk factors should be based clinically.  ****MR BREAST WAWIC BI W CAD**** 10/02/24  INTERPRETATION:  CLINICAL INDICATION: 48 years old female presents for breast MRI for requested 6 month MRI follow-up from 4/1/2024 for a 1.1 cm T2 hyperintense enhancing mass posterior upper outer right breast felt to be probably benign initially identified on 9/22/2023. Additionally bilateral enhancing foci with areas of patchy parenchymal enhancement were also requested for follow-up.  FINDINGS:  The breast parenchyma is composed of heterogenous fibroglandular tissue.   The breasts demonstrate marked background parenchymal enhancement. This significantly lowers the sensitivity of the breast MRI for detection of small enhancing lesions. Scattered sub-centimeter cysts. RIGHT BREAST: -Right upper outer breast redemonstrated 1.1 cm T2 avid enhancing mass with dark internal non-enhancing septation without significant change dating back to 9/22/2023 consistent with fibroadenoma (postcontrast image 112). -There are numerable similar-appearing scattered enhancing nonspecific foci.  There is no suspicious enhancement in the right breast. LEFT BREAST: -There are innumerable similar-appearing scattered enhancing nonspecific foci. Dominant foci in the lower outer quadrant are without significant change. There is no suspicious enhancement in the left breast. AXILLA/OTHER: -There is no significant axillary or internal mammary lymphadenopathy. IMPRESSION: -No MRI evidence of malignancy. -Right breast area of concern demonstrating stability since 9/22/2023 consistent with benign fibroadenoma. No additional follow-up is warranted. RECOMMENDATION:   -Resume Annual Mammography on Schedule. BI-RADS 2 - Benign Finding(s)   ***MAMMO DIG HAYDEN***  04/18/24  CLINICAL INDICATION: Patient is 48 years old and is seen for screening. The patient has no personal history of cancer. The patient has a history of right excision in November, 2022 - benign. The patient has the following family history of breast cancer: paternal aunt, at age 40.  RISK ASSESSMENT: Jose Lifetime Risk: 26.1%   Imaging at [facility]  2/18/22 screening mammo impression- left breast additional imaging at this time with diagnostic mammography and u/s. Birads 0. new low density mass upper outer left posterior not accounted for on today's same day screening u/s  2/28/22 left diagnostic mammo edil impression- probable benign finding, benign breast nodule  6/8/22 right breast ultrasound impression-right breast corresponds to a stable 2.5cm complicated cyst. subcentimeter hypoechoic nodules are new and probably benign. Recommend 6 month follow up is advised. Birads 3  [facility] B[facility] and Ultrasound Breast Limited Left: Density D, Impression - No suspicious mass, microcalcifications or other sign of malignancy is identified. A circumscribed mass in the upper anterior left breast has increased in size, now measuring 1 cm. This correlated with cyst on concurrent ultrasound. No significant change in right breast. No mammographic or sonographic evidence of malignancy. BI-RADS 2   07/21/23: [facility]: Ultrasound Breast Limited Right: Impression - No significant change in right breast ultrasound findings for one year. Continued ultrasound follow up in 6 months to confirm longer term stability. BI-RADS 3   12/02/24: Pt is transferring care from my collegue Dr. Camargo.  04/18/24 MAMMO SCREEN: No mammographic or sonographic evidence of malignancy. No suspicious changes.  LAST CLINICAL BREAST EXAM: The patient reports their last clinical breast exam was performed within the past year.  COMPARISON STUDIES: The present examination has been compared to a prior imaging study dated 03/31/2023, 9/29/2022, 2/28/2022, 2/18/2022, 2/15/2021, and 2/6/2020.  TECHNIQUE: Bilateral mammography was performed including CC and MLO views. Left breast magnification views were obtained. Additional imaging analysis was performed using CAD (computer-aided detection) software. Digital breast tomosynthesis was performed and used in the interpretation of images.  FINDINGS: The breasts are extremely dense, which lowers the sensitivity of mammography.  No suspicious mass, suspicious microcalcifications, or other sign of malignancy is identified.  There are new calcifications in the upper-outer left breast, which on the magnification views appear to have a layering appearance, compatible with benign milk of calcium.  Postsurgical scar in the upper-outer posterior left breast is unchanged.  Bilateral nodularity is again noted.  BREAST ARTERIAL CALCIFICATION ([place]): Grade 0 - No vascular calcifications. Note: The absence of breast arterial calcification does not exclude cardiovascular disease. Management of cardiovascular risk factors should be based clinically.  BILATERAL BREAST ULTRASOUND COMPLETE  CLINICAL INDICATION: The breasts are extremely dense, which lowers the sensitivity of mammography. Recommended ultrasound follow-up.  COMPARISON STUDIES: Dated  7/21/2023, 3/31/2023, 1/13/2023, 8/29/2022, 6/8/2022, 2/28/2022, 2/18/2022, 2/25/2021, 2/15/2021, 8/7/2020, 2/6/2020.  RIGHT BREAST: Sonographic evaluation of the right breast was performed in its entirety, including each of the four quadrants and the retroareolar region, in clockwise fashion. Images were obtained by  the technologist and submitted for interpretation.  FINDINGS: The breast parenchyma demonstrates a heterogeneous background echotexture (mixed fatty and fibroglandular).  No suspicious solid mass.  At 6:00, 6 cm from the nipple there is no significant change since the 6/8/2022 images in a 0.9 x 0.3 x 0.8 cm circumscribed ovoid hypoechoic nodule.  At the retroareolar position there is no significant change since the 6/8/2022 images in a 0.7 x 0.4 x 0.5 cm circumscribed ovoid hypoechoic nodule.  Multiple scattered cysts are noted in the right breast, up to 1.1 cm in size.  LEFT BREAST: Sonographic evaluation of the left breast was performed in its entirety, including each of the four quadrants and the retroareolar region, in clockwise fashion. Images were obtained by  the technologist and submitted for interpretation.  FINDINGS: The breast parenchyma demonstrates a heterogeneous background echotexture (mixed fatty and fibroglandular).  No suspicious solid mass.  At 12:00, 3 cm from the nipple there is no significant change since the 2/6/2020 images in a 0.7 x 0.5 x 0.5 cm circumscribed hypoechoic nodule.  At 2:00, 4 cm from the nipple is a 0.5 x 0.3 x 0.5 cm circumscribed ovoid hypoechoic nodule without significant change since the 2/15/2021 images.  Multiple scattered cysts are noted in the left breast up to 0.7 cm in size.  IMPRESSION: No mammographic or sonographic evidence of malignancy. No suspicious changes.  RECOMMENDATION:  Mammography in 1 year.  Additionally, please note the last breast MRI report of 4/1/2024 recommended follow-up MRI in 6 months.  BI-RADS 2 - Benign Finding(s)

## 2025-07-11 NOTE — PLAN
[FreeTextEntry1] :  49 year old female presents for annual exam.  -Pap/HPV done -Mammo UTD -Colonoscopy UTD -Discussed HRT, pt is interested, disucssed r/b/a -Rx patch has Mirena iud  -F.U 3 months telehealth for HRT Lucila Recinos MD

## 2025-07-11 NOTE — END OF VISIT
[FreeTextEntry3] : I, Teenatae Hsugoran, acted as a scribe on behalf of Dr. Recinos on 07/11/2025 .  All medical entries made by the scribe were at my, Dr. Recinos, direction and personally dictated by me on 07/11/2025 . I have reviewed the chart and agree that the record accurately reflects my personal performance of the history, physical exam, assessment and plan. I have also personally directed, reviewed, and agreed with the chart.

## 2025-07-11 NOTE — HISTORY OF PRESENT ILLNESS
[FreeTextEntry1] :  49 year old female presents for annual exam. Pt reports Mirena IUD is making periods irregular. She denies pain or cramping. Pt reports mood swings, bloating, hot flashes and increased hunger. perimenopausal sx, desires HRT utd with mammogram, colonooscopy